# Patient Record
Sex: MALE
[De-identification: names, ages, dates, MRNs, and addresses within clinical notes are randomized per-mention and may not be internally consistent; named-entity substitution may affect disease eponyms.]

---

## 2019-03-20 ENCOUNTER — TRANSCRIPTION ENCOUNTER (OUTPATIENT)
Age: 59
End: 2019-03-20

## 2019-04-01 PROBLEM — Z00.00 ENCOUNTER FOR PREVENTIVE HEALTH EXAMINATION: Status: ACTIVE | Noted: 2019-04-01

## 2019-05-15 ENCOUNTER — APPOINTMENT (OUTPATIENT)
Dept: RHEUMATOLOGY | Facility: CLINIC | Age: 59
End: 2019-05-15
Payer: COMMERCIAL

## 2019-05-15 VITALS
HEIGHT: 65.5 IN | SYSTOLIC BLOOD PRESSURE: 124 MMHG | WEIGHT: 189 LBS | DIASTOLIC BLOOD PRESSURE: 80 MMHG | OXYGEN SATURATION: 99 % | BODY MASS INDEX: 31.11 KG/M2 | HEART RATE: 84 BPM

## 2019-05-15 DIAGNOSIS — M25.569 PAIN IN UNSPECIFIED KNEE: ICD-10-CM

## 2019-05-15 DIAGNOSIS — S86.019A STRAIN OF UNSPECIFIED ACHILLES TENDON, INITIAL ENCOUNTER: ICD-10-CM

## 2019-05-15 DIAGNOSIS — M19.049 PRIMARY OSTEOARTHRITIS, UNSPECIFIED HAND: ICD-10-CM

## 2019-05-15 DIAGNOSIS — M54.5 LOW BACK PAIN: ICD-10-CM

## 2019-05-15 DIAGNOSIS — Z82.69 FAMILY HISTORY OF OTHER DISEASES OF THE MUSCULOSKELETAL SYSTEM AND CONNECTIVE TISSUE: ICD-10-CM

## 2019-05-15 DIAGNOSIS — Z78.9 OTHER SPECIFIED HEALTH STATUS: ICD-10-CM

## 2019-05-15 PROCEDURE — 36415 COLL VENOUS BLD VENIPUNCTURE: CPT

## 2019-05-15 PROCEDURE — 99204 OFFICE O/P NEW MOD 45 MIN: CPT | Mod: 25

## 2019-05-18 NOTE — REVIEW OF SYSTEMS
[As Noted in HPI] : as noted in HPI [Fever] : no fever [Chills] : no chills [Eye Pain] : no eye pain [Red Eyes] : eyes not red [Earache] : no earache [Nosebleeds] : no nosebleeds [Shortness Of Breath] : no shortness of breath [Chest Pain] : no chest pain [Abdominal Pain] : no abdominal pain [Vomiting] : no vomiting [Dysuria] : no dysuria [Skin Lesions] : no skin lesions [Confused] : no confusion [Convulsions] : no convulsions [Proptosis] : no proptosis [Suicidal] : not suicidal [Muscle Weakness] : no muscle weakness [Easy Bleeding] : no tendency for easy bleeding

## 2019-05-18 NOTE — HISTORY OF PRESENT ILLNESS
[FreeTextEntry1] : 59-year-old male here for the first time. Patient states that about 2 months ago he had an episode of joint aches that started in his left hand and left wrist that then moved to his right hand and then into the elbows and shoulders which lasted for about a week with some swelling. Patient states he took some Motrin.\par He states he has never had a prior attack like this before or after this incident, \par States he does not recall being ill around then.\par States he worries about tickborne diseases as his dog has had ticks.\par States she notes some intermittent achy pain for over 2 yrs rated 4/10 for severity in her knees w/o swelling or warmth w/ activity/stairs at times and denies any crystal arthropathy like attacks.\par States he can notice at times mild left Achilles pain mainly when he plays tennis or with sports & not at rest.\par States he's had right Achilles tendon rupture repair in the past without any pain there anymore.\par States he can notice some lower back pain worse with lying down & better with activity. Denies any red flag symptoms.\par States he notices some back pain for the past 4 months with some stiffness. Denies any loss of bladder or bowel incontinence or saddle anesthesias.\par Denies any fever/chills, no rashes, no ulcers, no dry eyes, no dry mouth, no raynaud's, no GI/ symptoms at this time.\par

## 2019-05-18 NOTE — PHYSICAL EXAM
[General Appearance - Well Nourished] : well nourished [General Appearance - Alert] : alert [Extraocular Movements] : extraocular movements were intact [Sclera] : the sclera and conjunctiva were normal [Nasal Cavity] : the nasal mucosa and septum were normal [Outer Ear] : the ears and nose were normal in appearance [Respiration, Rhythm And Depth] : normal respiratory rhythm and effort [Neck Appearance] : the appearance of the neck was normal [Heart Rate And Rhythm] : heart rate was normal and rhythm regular [Auscultation Breath Sounds / Voice Sounds] : lungs were clear to auscultation bilaterally [Abdomen Soft] : soft [Bowel Sounds] : normal bowel sounds [Heart Sounds] : normal S1 and S2 [Abdomen Tenderness] : non-tender [Cervical Lymph Nodes Enlarged Anterior Bilaterally] : anterior cervical [No CVA Tenderness] : no ~M costovertebral angle tenderness [Supraclavicular Lymph Nodes Enlarged Bilaterally] : supraclavicular [Motor Tone] : muscle strength and tone were normal [] : no rash [Cranial Nerves] : cranial nerves 2-12 were intact [No Focal Deficits] : no focal deficits [Impaired Insight] : insight and judgment were intact [Mood] : the mood was normal [FreeTextEntry1] : mild tenderness in c-spine w/ rotation w/ good ROM; mild lumbosacral tenderness w/ good ROM

## 2019-05-18 NOTE — ASSESSMENT
[FreeTextEntry1] : 58 y/o M here for the first time w/ reports of one episodic attack of migratory joint aches that started in the Lt hand then moved to the Rt hand then the elbows and shoulders lasting about 1 week about 2 months ago w/o further attacks thus far here to rule out palindromic rheumatism or tick borne disease or sero-negative spondyloarthropathy or crystal arthropathy (though not as excruciating as gout/ pseudogout he states) and monitoring. \par -No synovitis or effusion on exam noted today and advised to monitor.\par -labs as below incld ESR, CRP, serologies, lyme/tick panel, TSH\par -Referred for xray of b/l hands to evaluate for structural changes, r/o periarticular osteopenia/RA, r/o erosions\par \par Knee pain -Referred for xray of b/l knees to evaluate for structural changes, OA\par -consider steroid injections \par \par Cervicalgia: tenderness in c-spine w/ rotation w/ good ROM w/o paresthesias \par -Referred for xray of c-spine to evaluate for structural changes, DDD\par \par LBP: intermittent \par -Referred for xray of LS spine to evaluate for structural changes, r/o sacroiliitis, DDD\par -Motrin PRN w/ food needed sparingly helps\par \par -educated on symptoms to monitor for in detail and alert us if any concerns.\par -f/u in 10-14days w/ labs, xrays please\par

## 2019-05-28 LAB
25(OH)D3 SERPL-MCNC: 20.6 NG/ML
A PHAGOCYTOPH IGG TITR SER IF: NORMAL TITER
ALBUMIN SERPL ELPH-MCNC: 4.7 G/DL
ALP BLD-CCNC: 67 U/L
ALT SERPL-CCNC: 26 U/L
ANION GAP SERPL CALC-SCNC: 14 MMOL/L
AST SERPL-CCNC: 19 U/L
B BURGDOR AB SER QL IA: NEGATIVE
B BURGDOR AB SER-IMP: NEGATIVE
B BURGDOR IGM PATRN SER IB-IMP: NEGATIVE
B BURGDOR18/20KD IGM SER QL IB: NORMAL
B BURGDOR18KD IGG SER QL IB: NORMAL
B BURGDOR23KD IGG SER QL IB: NORMAL
B BURGDOR23KD IGM SER QL IB: NORMAL
B BURGDOR28KD AB SER QL IB: NORMAL
B BURGDOR28KD IGG SER QL IB: NORMAL
B BURGDOR30KD AB SER QL IB: NORMAL
B BURGDOR30KD IGG SER QL IB: NORMAL
B BURGDOR31KD IGG SER QL IB: NORMAL
B BURGDOR31KD IGM SER QL IB: NORMAL
B BURGDOR39KD IGG SER QL IB: NORMAL
B BURGDOR39KD IGM SER QL IB: NORMAL
B BURGDOR41KD IGG SER QL IB: PRESENT
B BURGDOR41KD IGM SER QL IB: PRESENT
B BURGDOR45KD AB SER QL IB: NORMAL
B BURGDOR45KD IGG SER QL IB: NORMAL
B BURGDOR58KD AB SER QL IB: NORMAL
B BURGDOR58KD IGG SER QL IB: NORMAL
B BURGDOR66KD IGG SER QL IB: NORMAL
B BURGDOR66KD IGM SER QL IB: NORMAL
B BURGDOR93KD IGG SER QL IB: NORMAL
B BURGDOR93KD IGM SER QL IB: NORMAL
B MICROTI IGG TITR SER: NORMAL TITER
B19V IGG SER QL IA: 6.2 INDEX
B19V IGG+IGM SER-IMP: NORMAL
B19V IGG+IGM SER-IMP: POSITIVE
B19V IGM FLD-ACNC: 11.7 INDEX
B19V IGM SER-ACNC: POSITIVE
BASOPHILS # BLD AUTO: 0.05 K/UL
BASOPHILS NFR BLD AUTO: 0.7 %
BILIRUB SERPL-MCNC: 0.3 MG/DL
BUN SERPL-MCNC: 21 MG/DL
CALCIUM SERPL-MCNC: 9.9 MG/DL
CCP AB SER IA-ACNC: <8 UNITS
CHLORIDE SERPL-SCNC: 103 MMOL/L
CK SERPL-CCNC: 104 U/L
CO2 SERPL-SCNC: 22 MMOL/L
CREAT SERPL-MCNC: 1.03 MG/DL
CRP SERPL-MCNC: 0.11 MG/DL
E CHAFFEENSIS IGG TITR SER IF: NORMAL TITER
ENA SS-A AB SER IA-ACNC: <0.2 AL
ENA SS-B AB SER IA-ACNC: <0.2 AL
EOSINOPHIL # BLD AUTO: 0.36 K/UL
EOSINOPHIL NFR BLD AUTO: 4.9 %
ERYTHROCYTE [SEDIMENTATION RATE] IN BLOOD BY WESTERGREN METHOD: 26 MM/HR
G6PD SER-CCNC: 16.6 U/G HGB
GLUCOSE SERPL-MCNC: 133 MG/DL
HBV SURFACE AG SER QL: NONREACTIVE
HCT VFR BLD CALC: 47.3 %
HCV AB SER QL: NONREACTIVE
HCV S/CO RATIO: 0.3 S/CO
HGB BLD-MCNC: 14.5 G/DL
HLA-B27 RELATED AG QL: NORMAL
IMM GRANULOCYTES NFR BLD AUTO: 0.3 %
LYMPHOCYTES # BLD AUTO: 2.22 K/UL
LYMPHOCYTES NFR BLD AUTO: 30.2 %
MAN DIFF?: NORMAL
MCHC RBC-ENTMCNC: 27 PG
MCHC RBC-ENTMCNC: 30.7 GM/DL
MCV RBC AUTO: 88.1 FL
MONOCYTES # BLD AUTO: 0.49 K/UL
MONOCYTES NFR BLD AUTO: 6.7 %
NEUTROPHILS # BLD AUTO: 4.2 K/UL
NEUTROPHILS NFR BLD AUTO: 57.2 %
PLATELET # BLD AUTO: 325 K/UL
POTASSIUM SERPL-SCNC: 4.9 MMOL/L
PROT SERPL-MCNC: 7.2 G/DL
RBC # BLD: 5.37 M/UL
RBC # FLD: 13.9 %
RF+CCP IGG SER-IMP: NEGATIVE
RHEUMATOID FACT SER QL: 11 IU/ML
SODIUM SERPL-SCNC: 139 MMOL/L
TSH SERPL-ACNC: 1.67 UIU/ML
URATE SERPL-MCNC: 7.2 MG/DL
WBC # FLD AUTO: 7.34 K/UL

## 2019-06-12 ENCOUNTER — APPOINTMENT (OUTPATIENT)
Dept: RHEUMATOLOGY | Facility: CLINIC | Age: 59
End: 2019-06-12
Payer: COMMERCIAL

## 2019-06-12 VITALS
HEART RATE: 80 BPM | BODY MASS INDEX: 31.11 KG/M2 | OXYGEN SATURATION: 98 % | HEIGHT: 65.5 IN | SYSTOLIC BLOOD PRESSURE: 114 MMHG | DIASTOLIC BLOOD PRESSURE: 82 MMHG | WEIGHT: 189 LBS

## 2019-06-12 DIAGNOSIS — B34.3 PARVOVIRUS INFECTION, UNSPECIFIED: ICD-10-CM

## 2019-06-12 DIAGNOSIS — M25.50 PAIN IN UNSPECIFIED JOINT: ICD-10-CM

## 2019-06-12 PROCEDURE — 99214 OFFICE O/P EST MOD 30 MIN: CPT

## 2019-06-12 RX ORDER — ERGOCALCIFEROL 1.25 MG/1
1.25 MG CAPSULE, LIQUID FILLED ORAL
Qty: 4 | Refills: 1 | Status: ACTIVE | COMMUNITY
Start: 2019-06-12 | End: 1900-01-01

## 2019-06-12 NOTE — PHYSICAL EXAM
[General Appearance - Alert] : alert [Sclera] : the sclera and conjunctiva were normal [General Appearance - Well Nourished] : well nourished [Extraocular Movements] : extraocular movements were intact [Outer Ear] : the ears and nose were normal in appearance [Nasal Cavity] : the nasal mucosa and septum were normal [Neck Appearance] : the appearance of the neck was normal [Respiration, Rhythm And Depth] : normal respiratory rhythm and effort [Auscultation Breath Sounds / Voice Sounds] : lungs were clear to auscultation bilaterally [Heart Rate And Rhythm] : heart rate was normal and rhythm regular [Heart Sounds] : normal S1 and S2 [Bowel Sounds] : normal bowel sounds [Abdomen Soft] : soft [Supraclavicular Lymph Nodes Enlarged Bilaterally] : supraclavicular [Cervical Lymph Nodes Enlarged Anterior Bilaterally] : anterior cervical [Abdomen Tenderness] : non-tender [No CVA Tenderness] : no ~M costovertebral angle tenderness [] : no rash [Motor Tone] : muscle strength and tone were normal [No Focal Deficits] : no focal deficits [Cranial Nerves] : cranial nerves 2-12 were intact [Impaired Insight] : insight and judgment were intact [Mood] : the mood was normal [FreeTextEntry1] : mild Lt Achillis tenderness noted w/ good ROM; no synovitis or effusion on exam today

## 2019-06-12 NOTE — ASSESSMENT
[FreeTextEntry1] : 58 y/o M here for the first follow up w/ reports of one episodic attack of joint aches in the hands, elbows and shoulders & better now & discussed was likely secondary to Parvovirus B19 w/ IgM + on labs and discussed can mimic RA symptoms.  \par -No synovitis or effusion on exam noted today and advised to monitor.\par -reviewed labs 5/15/2019 in detail with mildly high ESR; NL CRP; parvovirus B19 IgM + & IgG +; other serologies within normal limits at this time incld RF/CCP neg; HLA-B27 negative; tick panel negative ; low vitD \par -high ESR discussed can be seen in the setting of the B19 viral infection and repeat for follow up \par \par Left Achillis pain: chronically maile w/ playing sports/tennis he reports\par -PT referral provided to see if resolves w/ stretching exercises  \par -discussed r/b/s of Motrin 400mg to 600mg PRN w/ food pt agreeable and prescription sent as below\par -will f/u w/ labs w/ ESR/CRP and if persistent w/ pain then will consider further imaging to rule out seronegative spondyloarthropathy as discussed \par \par Cervicalgia: tenderness in c-spine w/ rotation w/ good ROM w/o paresthesias \par -Referred for xray of c-spine to evaluate for structural changes, DDD if pain persists \par \par LBP: intermittent; HLA-B27 negative \par -Referred for xray of LS spine to evaluate for structural changes, r/o sacroiliitis, DDD if pain persists \par -Motrin PRN w/ food needed sparingly helps\par \par Hypovitaminosis D: low vitD w/ prescription for vitD repletion sent as discussed.\par \par -educated on symptoms to monitor for in detail and alert us if any concerns.\par -f/u in 6-8weeks w/ labs or sooner as needed \par

## 2019-06-12 NOTE — HISTORY OF PRESENT ILLNESS
[FreeTextEntry1] : 59-year-old male here for the first follow up to review labs today.  Today he states he is feeling well and the joint aches are much resolved now and no episode since then except noting some left Achillis pain.\par \par Patient states that about 3 months ago he had an episode of joint aches that started in his left hand and left wrist that then moved to his right hand and then into the elbows and shoulders which lasted for about a week with some swelling. Patient states he took some Motrin.\par He states he has never had a prior attack like this before or after this incident.\par States she notes some intermittent achy pain in the knees is better now also. \par \par States he can notice at times mild left Achilles pain mainly when he plays tennis or with sports & not at rest.\par States he's had right Achilles tendon rupture repair in the past without any pain there anymore.\par States he can notice some lower back pain worse with lying down & better with activity though not recently.  Denies any loss of bladder or bowel incontinence or saddle anesthesias.\par \par Denies any fever/chills, no rashes, no ulcers, no dry eyes, no dry mouth, no raynaud's, no GI/ symptoms at this time.

## 2019-06-12 NOTE — REASON FOR VISIT
[Follow-Up: _____] : a [unfilled] follow-up visit [FreeTextEntry1] : hx of migratory joint aches; review labs; chronic left Achillis pain

## 2019-06-12 NOTE — REVIEW OF SYSTEMS
[As Noted in HPI] : as noted in HPI [Chills] : no chills [Red Eyes] : eyes not red [Eye Pain] : no eye pain [Earache] : no earache [Nosebleeds] : no nosebleeds [Shortness Of Breath] : no shortness of breath [Chest Pain] : no chest pain [Abdominal Pain] : no abdominal pain [Vomiting] : no vomiting [Dysuria] : no dysuria [Joint Swelling] : no joint swelling [Confused] : no confusion [Skin Lesions] : no skin lesions [Suicidal] : not suicidal [Convulsions] : no convulsions [Muscle Weakness] : no muscle weakness [Proptosis] : no proptosis [Easy Bleeding] : no tendency for easy bleeding

## 2019-07-31 ENCOUNTER — APPOINTMENT (OUTPATIENT)
Dept: RHEUMATOLOGY | Facility: CLINIC | Age: 59
End: 2019-07-31
Payer: COMMERCIAL

## 2019-07-31 VITALS
DIASTOLIC BLOOD PRESSURE: 70 MMHG | BODY MASS INDEX: 31.11 KG/M2 | HEART RATE: 68 BPM | SYSTOLIC BLOOD PRESSURE: 112 MMHG | HEIGHT: 65.5 IN | WEIGHT: 189 LBS | OXYGEN SATURATION: 99 %

## 2019-07-31 DIAGNOSIS — M62.830 MUSCLE SPASM OF BACK: ICD-10-CM

## 2019-07-31 DIAGNOSIS — M54.2 CERVICALGIA: ICD-10-CM

## 2019-07-31 DIAGNOSIS — M76.60 ACHILLES TENDINITIS, UNSPECIFIED LEG: ICD-10-CM

## 2019-07-31 DIAGNOSIS — Z86.19 PERSONAL HISTORY OF OTHER INFECTIOUS AND PARASITIC DISEASES: ICD-10-CM

## 2019-07-31 DIAGNOSIS — E55.9 VITAMIN D DEFICIENCY, UNSPECIFIED: ICD-10-CM

## 2019-07-31 DIAGNOSIS — R70.0 ELEVATED ERYTHROCYTE SEDIMENTATION RATE: ICD-10-CM

## 2019-07-31 PROCEDURE — 99214 OFFICE O/P EST MOD 30 MIN: CPT | Mod: 25

## 2019-07-31 PROCEDURE — 36415 COLL VENOUS BLD VENIPUNCTURE: CPT

## 2019-07-31 RX ORDER — CYCLOBENZAPRINE HYDROCHLORIDE 10 MG/1
10 TABLET, FILM COATED ORAL
Qty: 30 | Refills: 1 | Status: ACTIVE | COMMUNITY
Start: 2019-07-31 | End: 1900-01-01

## 2019-07-31 NOTE — REVIEW OF SYSTEMS
[As Noted in HPI] : as noted in HPI [Fever] : no fever [Chills] : no chills [Eye Pain] : no eye pain [Red Eyes] : eyes not red [Earache] : no earache [Nosebleeds] : no nosebleeds [Chest Pain] : no chest pain [Shortness Of Breath] : no shortness of breath [Abdominal Pain] : no abdominal pain [Vomiting] : no vomiting [Dysuria] : no dysuria [Joint Swelling] : no joint swelling [Skin Lesions] : no skin lesions [Confused] : no confusion [Convulsions] : no convulsions [Suicidal] : not suicidal [Proptosis] : no proptosis [Muscle Weakness] : no muscle weakness [Easy Bleeding] : no tendency for easy bleeding

## 2019-07-31 NOTE — REASON FOR VISIT
[Follow-Up: _____] : a [unfilled] follow-up visit [FreeTextEntry1] : chronic left Achillis pain; B19 infection; labs/ med

## 2019-07-31 NOTE — ASSESSMENT
[FreeTextEntry1] : 58 y/o M here for follow up w/ reports of one episodic attack of joint aches in the hands, elbows and shoulders & better now & discussed was likely secondary to Parvovirus B19 w/ IgM + on labs and discussed can mimic RA symptoms. Also notes some chronic left Achillis pain intermittently. \par -No synovitis or effusion on exam noted today and advised to monitor.\par -labs 5/15/2019 in detail with mildly high ESR; NL CRP; parvovirus B19 IgM + & IgG +; other serologies within normal limits at this time incld RF/CCP neg; HLA-B27 negative; tick panel negative ; low vitD \par -high ESR discussed can be seen in the setting of the B19 viral infection and repeating today \par \par Left Achillis pain: chronically maile w/ playing sports/tennis he reports\par -PT referral provided to see if resolves w/ stretching exercises \par -discussed r/b/s of Motrin 400mg to 600mg PRN w/ food pt agreeable and prescription sent as below\par -rechecking ESR/CRP today and if persistent w/ pain then will consider further imaging to rule out seronegative spondyloarthropathy as discussed \par \par Cervicalgia: mild tenderness in c-spine w/ rotation w/ good ROM w/o paresthesias \par -has xray referral of c-spine to evaluate for structural changes, DDD if pain persists \par -paraspinal tightness/spams: discussed r/b/s of cyclobenzaprine PRN w/ pt agreeable and prescription sent as below\par \par LBP: intermittent; HLA-B27 negative \par -has xray referral of LS spine to evaluate for structural changes, r/o sacroiliitis, DDD if pain persists \par -Motrin PRN w/ food needed sparingly helps\par \par Hypovitaminosis D: finished vit D and will check on labs today  \par \par -educated on symptoms to monitor for in detail and alert us if any concerns.\par -will call with labs results \par -f/u in 6-12mo PRN \par \par

## 2019-07-31 NOTE — PHYSICAL EXAM
[General Appearance - Alert] : alert [General Appearance - Well Nourished] : well nourished [Sclera] : the sclera and conjunctiva were normal [Extraocular Movements] : extraocular movements were intact [Outer Ear] : the ears and nose were normal in appearance [Nasal Cavity] : the nasal mucosa and septum were normal [Neck Appearance] : the appearance of the neck was normal [Respiration, Rhythm And Depth] : normal respiratory rhythm and effort [Auscultation Breath Sounds / Voice Sounds] : lungs were clear to auscultation bilaterally [Heart Rate And Rhythm] : heart rate was normal and rhythm regular [Heart Sounds] : normal S1 and S2 [Bowel Sounds] : normal bowel sounds [Abdomen Soft] : soft [Abdomen Tenderness] : non-tender [No CVA Tenderness] : no ~M costovertebral angle tenderness [Motor Tone] : muscle strength and tone were normal [] : no rash [Cranial Nerves] : cranial nerves 2-12 were intact [No Focal Deficits] : no focal deficits [Mood] : the mood was normal [Impaired Insight] : insight and judgment were intact [FreeTextEntry1] : no synovitis or effusion on exam noted today; no left Achillis tenderness at rest; Has full ROM in b/l shoulders, no pelvic/girdle stiffness and able to stand up without using his hands

## 2019-07-31 NOTE — HISTORY OF PRESENT ILLNESS
[FreeTextEntry1] : 59-year-old male here for follow up w/ hx of +B19 IgM and raised ESR. Today he states he is feeling well and the joint aches are much resolved now and no episode since then except noting some left Achillis pain intermittently. States he notes some intermittent neck pain w/o paresthesias. No pain in the shoulders.\par Has full ROM in b/l shoulders, no pelvic/girdle stiffness and able to stand up without using his hands, no temporal pain/unremitting headaches, no vision changes, no jaw pain.\par \par Few months ago he had an episode of joint aches that started in his left hand and left wrist that then moved to his right hand and then into the elbows and shoulders which lasted for about a week with some swelling. Patient states he took some Motrin.\par He states he has never had a prior attack like this before or after this incident.\par States she notes some intermittent achy pain in the knees is better now also. \par Denies any swelling or redness or warmth of the joints today. \par \par States he can notice at times mild left Achilles pain mainly when he plays tennis or with sports & not at rest. States he would like PT referral again and plans to try going there. \par States he's had right Achilles tendon rupture repair in the past without any pain there anymore.\par States he can notice some lower back pain worse with lying down & better with activity though not recently. Denies any loss of bladder or bowel incontinence or saddle anesthesias.\par States he has xray referral and knows can use it if pain. \par \par Denies any fever/chills, no rashes, no ulcers, no dry eyes, no dry mouth, no raynaud's, no GI/ symptoms at this time. \par

## 2019-08-18 LAB
25(OH)D3 SERPL-MCNC: 24.1 NG/ML
B19V IGG SER QL IA: 6.6 INDEX
B19V IGG+IGM SER-IMP: NORMAL
B19V IGG+IGM SER-IMP: POSITIVE
B19V IGM FLD-ACNC: 8 INDEX
B19V IGM SER-ACNC: POSITIVE
BASOPHILS # BLD AUTO: 0.07 K/UL
BASOPHILS NFR BLD AUTO: 0.9 %
CRP SERPL-MCNC: 0.12 MG/DL
EOSINOPHIL # BLD AUTO: 0.43 K/UL
EOSINOPHIL NFR BLD AUTO: 5.6 %
ERYTHROCYTE [SEDIMENTATION RATE] IN BLOOD BY WESTERGREN METHOD: 15 MM/HR
HCT VFR BLD CALC: 47.3 %
HGB BLD-MCNC: 14.5 G/DL
HLA-B27 RELATED AG QL: NORMAL
IMM GRANULOCYTES NFR BLD AUTO: 0.1 %
LYMPHOCYTES # BLD AUTO: 2.35 K/UL
LYMPHOCYTES NFR BLD AUTO: 30.4 %
MAN DIFF?: NORMAL
MCHC RBC-ENTMCNC: 27 PG
MCHC RBC-ENTMCNC: 30.7 GM/DL
MCV RBC AUTO: 87.9 FL
MONOCYTES # BLD AUTO: 0.52 K/UL
MONOCYTES NFR BLD AUTO: 6.7 %
NEUTROPHILS # BLD AUTO: 4.36 K/UL
NEUTROPHILS NFR BLD AUTO: 56.3 %
PLATELET # BLD AUTO: 303 K/UL
RBC # BLD: 5.38 M/UL
RBC # FLD: 13.3 %
WBC # FLD AUTO: 7.74 K/UL

## 2021-12-29 ENCOUNTER — TRANSCRIPTION ENCOUNTER (OUTPATIENT)
Age: 61
End: 2021-12-29

## 2021-12-30 ENCOUNTER — TRANSCRIPTION ENCOUNTER (OUTPATIENT)
Age: 61
End: 2021-12-30

## 2024-06-06 ENCOUNTER — OFFICE (OUTPATIENT)
Dept: URBAN - METROPOLITAN AREA CLINIC 100 | Facility: CLINIC | Age: 64
Setting detail: OPHTHALMOLOGY
End: 2024-06-06
Payer: COMMERCIAL

## 2024-06-06 DIAGNOSIS — H00.14: ICD-10-CM

## 2024-06-06 DIAGNOSIS — H01.004: ICD-10-CM

## 2024-06-06 DIAGNOSIS — H01.001: ICD-10-CM

## 2024-06-06 PROCEDURE — 92002 INTRM OPH EXAM NEW PATIENT: CPT | Mod: 25 | Performed by: OPHTHALMOLOGY

## 2024-06-06 PROCEDURE — 67800 REMOVE EYELID LESION: CPT | Mod: E1 | Performed by: OPHTHALMOLOGY

## 2024-06-06 PROCEDURE — 92285 EXTERNAL OCULAR PHOTOGRAPHY: CPT | Performed by: OPHTHALMOLOGY

## 2024-06-06 ASSESSMENT — CONFRONTATIONAL VISUAL FIELD TEST (CVF)
OD_FINDINGS: FULL
OS_FINDINGS: FULL

## 2024-06-06 ASSESSMENT — LID EXAM ASSESSMENTS
OS_BLEPHARITIS: LUL
OD_BLEPHARITIS: RUL

## 2024-06-24 ENCOUNTER — OFFICE (OUTPATIENT)
Dept: URBAN - METROPOLITAN AREA CLINIC 100 | Facility: CLINIC | Age: 64
Setting detail: OPHTHALMOLOGY
End: 2024-06-24
Payer: COMMERCIAL

## 2024-06-24 DIAGNOSIS — H01.001: ICD-10-CM

## 2024-06-24 DIAGNOSIS — H00.14: ICD-10-CM

## 2024-06-24 DIAGNOSIS — H01.004: ICD-10-CM

## 2024-06-24 PROCEDURE — 92012 INTRM OPH EXAM EST PATIENT: CPT | Performed by: OPHTHALMOLOGY

## 2024-06-24 ASSESSMENT — CONFRONTATIONAL VISUAL FIELD TEST (CVF)
OD_FINDINGS: FULL
OS_FINDINGS: FULL

## 2024-06-24 ASSESSMENT — LID EXAM ASSESSMENTS
OD_BLEPHARITIS: RUL
OS_BLEPHARITIS: LUL

## 2024-06-25 ENCOUNTER — NON-APPOINTMENT (OUTPATIENT)
Age: 64
End: 2024-06-25

## 2024-08-08 ENCOUNTER — OFFICE (OUTPATIENT)
Dept: URBAN - METROPOLITAN AREA CLINIC 100 | Facility: CLINIC | Age: 64
Setting detail: OPHTHALMOLOGY
End: 2024-08-08
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
## 2024-08-08 PROCEDURE — NO SHOW FE NO SHOW FEE: Performed by: OPHTHALMOLOGY

## 2024-08-22 ENCOUNTER — OFFICE (OUTPATIENT)
Dept: URBAN - METROPOLITAN AREA CLINIC 100 | Facility: CLINIC | Age: 64
Setting detail: OPHTHALMOLOGY
End: 2024-08-22
Payer: COMMERCIAL

## 2024-08-22 DIAGNOSIS — H00.14: ICD-10-CM

## 2024-08-22 PROCEDURE — 11900 INJECT SKIN LESIONS </W 7: CPT | Mod: E1 | Performed by: OPHTHALMOLOGY

## 2024-08-22 ASSESSMENT — LID EXAM ASSESSMENTS
OD_BLEPHARITIS: RUL
OS_BLEPHARITIS: LUL

## 2024-08-22 ASSESSMENT — CONFRONTATIONAL VISUAL FIELD TEST (CVF)
OD_FINDINGS: FULL
OS_FINDINGS: FULL

## 2024-10-24 ENCOUNTER — OFFICE (OUTPATIENT)
Dept: URBAN - METROPOLITAN AREA CLINIC 100 | Facility: CLINIC | Age: 64
Setting detail: OPHTHALMOLOGY
End: 2024-10-24
Payer: COMMERCIAL

## 2024-10-24 DIAGNOSIS — H52.7: ICD-10-CM

## 2024-10-24 DIAGNOSIS — H00.14: ICD-10-CM

## 2024-10-24 DIAGNOSIS — H01.001: ICD-10-CM

## 2024-10-24 DIAGNOSIS — H01.004: ICD-10-CM

## 2024-10-24 PROCEDURE — 92012 INTRM OPH EXAM EST PATIENT: CPT | Performed by: OPHTHALMOLOGY

## 2024-10-24 ASSESSMENT — CONFRONTATIONAL VISUAL FIELD TEST (CVF)
OS_FINDINGS: FULL
OD_FINDINGS: FULL

## 2024-10-24 ASSESSMENT — VISUAL ACUITY
OD_BCVA: 20/25
OS_BCVA: 20/25-2

## 2024-10-24 ASSESSMENT — LID EXAM ASSESSMENTS
OD_BLEPHARITIS: RUL
OS_BLEPHARITIS: LUL